# Patient Record
Sex: FEMALE | Race: WHITE | NOT HISPANIC OR LATINO
[De-identification: names, ages, dates, MRNs, and addresses within clinical notes are randomized per-mention and may not be internally consistent; named-entity substitution may affect disease eponyms.]

---

## 2020-06-29 PROBLEM — Z00.00 ENCOUNTER FOR PREVENTIVE HEALTH EXAMINATION: Status: ACTIVE | Noted: 2020-06-29

## 2020-07-02 ENCOUNTER — APPOINTMENT (OUTPATIENT)
Dept: GASTROENTEROLOGY | Facility: CLINIC | Age: 85
End: 2020-07-02
Payer: MEDICARE

## 2020-07-02 VITALS
TEMPERATURE: 98.4 F | BODY MASS INDEX: 24.98 KG/M2 | SYSTOLIC BLOOD PRESSURE: 136 MMHG | WEIGHT: 119 LBS | HEIGHT: 58 IN | DIASTOLIC BLOOD PRESSURE: 64 MMHG | HEART RATE: 56 BPM

## 2020-07-02 DIAGNOSIS — R41.89 OTHER SYMPTOMS AND SIGNS INVOLVING COGNITIVE FUNCTIONS AND AWARENESS: ICD-10-CM

## 2020-07-02 DIAGNOSIS — Z78.9 OTHER SPECIFIED HEALTH STATUS: ICD-10-CM

## 2020-07-02 DIAGNOSIS — Z85.038 PERSONAL HISTORY OF OTHER MALIGNANT NEOPLASM OF LARGE INTESTINE: ICD-10-CM

## 2020-07-02 DIAGNOSIS — Z86.59 PERSONAL HISTORY OF OTHER MENTAL AND BEHAVIORAL DISORDERS: ICD-10-CM

## 2020-07-02 DIAGNOSIS — C80.1 MALIGNANT (PRIMARY) NEOPLASM, UNSPECIFIED: ICD-10-CM

## 2020-07-02 PROCEDURE — 99214 OFFICE O/P EST MOD 30 MIN: CPT

## 2020-07-02 RX ORDER — DONEPEZIL HYDROCHLORIDE 5 MG/1
5 TABLET, FILM COATED ORAL
Refills: 0 | Status: ACTIVE | COMMUNITY

## 2020-07-02 RX ORDER — LITHIUM CARBONATE 600 MG/1
CAPSULE ORAL
Refills: 0 | Status: ACTIVE | COMMUNITY

## 2020-07-02 RX ORDER — MIRTAZAPINE 30 MG/1
30 TABLET, ORALLY DISINTEGRATING ORAL
Refills: 0 | Status: ACTIVE | COMMUNITY

## 2020-07-02 RX ORDER — ATORVASTATIN CALCIUM 10 MG/1
10 TABLET, FILM COATED ORAL
Refills: 0 | Status: ACTIVE | COMMUNITY

## 2020-07-02 RX ORDER — ASPIRIN 325 MG
81 TABLET ORAL
Refills: 0 | Status: ACTIVE | COMMUNITY

## 2020-07-02 RX ORDER — METOPROLOL TARTRATE 100 MG/1
100 TABLET, FILM COATED ORAL
Refills: 0 | Status: ACTIVE | COMMUNITY

## 2020-07-02 RX ORDER — DIVALPROEX SODIUM 250 MG/1
250 TABLET, DELAYED RELEASE ORAL
Refills: 0 | Status: ACTIVE | COMMUNITY

## 2020-07-02 RX ORDER — QUETIAPINE 400 MG/1
TABLET, FILM COATED ORAL
Refills: 0 | Status: ACTIVE | COMMUNITY

## 2020-07-04 NOTE — HISTORY OF PRESENT ILLNESS
[FreeTextEntry1] : patient is here for follow up because of a history of a malignant colonic polyp, sigmoid colon, managed by colonoscopy alone\par she had ear last colonoscopy 4.5 years ago, and is due\par \par Patient has some cognitive difficulties, but [facility]\par \par no cardiovascular disease\par no diabetes\par no hx of smoking, no pulmonary disease\par \par  Depth Of Biopsy: dermis Bill For Surgical Tray: no Wound Care: No ointment Detail Level: Detailed Electrodesiccation Text: The wound bed was treated with electrodesiccation after the biopsy was performed. Type Of Destruction Used: Curettage Cryotherapy Text: The wound bed was treated with cryotherapy after the biopsy was performed. X Size Of Lesion In Cm: 0 Billing Type: Third-Party Bill Silver Nitrate Text: The wound bed was treated with silver nitrate after the biopsy was performed. Biopsy Type: H and E Consent: Written consent was obtained and risks were reviewed including but not limited to scarring, infection, bleeding, scabbing, incomplete removal, nerve damage and allergy to anesthesia. Was A Bandage Applied: Yes Anesthesia Type: 1% lidocaine with epinephrine Hemostasis: Drysol Anesthesia Volume In Cc: 0.5 Curettage Text: The wound bed was treated with curettage after the biopsy was performed. Electrodesiccation And Curettage Text: The wound bed was treated with electrodesiccation and curettage after the biopsy was performed. Post-Care Instructions: I reviewed with the patient in detail post-care instructions. Patient is to keep the biopsy site dry overnight, and then apply bacitracin twice daily until healed. Patient may apply hydrogen peroxide soaks to remove any crusting. Notification Instructions: Patient will be notified of biopsy results. However, patient instructed to call the office if not contacted within 2 weeks. Dressing: bandage Biopsy Method: Dermablade

## 2020-07-04 NOTE — ASSESSMENT
[FreeTextEntry1] : history of malignant colonic polyp, managed by colonoscopy\par 2.  last colonoscopy 4.5 years ago\par 3.  detailed discussion with patient's daughter Tejas, well know to me over the years, and we feel that it is worthwhile to proceed with one final colonoscopy.\par 4.  we discussed cologard...\par but for now we are planning to do colonoscopy unless the patients daughters decide otherwise\par \par plan\par 1.  colonoscopy\par 2.  bowel prep reviewed\par DR LOGAN RECOMMENDATIONS FOR OPTIMAL BOWEL PREP\par \par 1. split the Miralax drink: half the night before, and half the morning of\par 2.  dulcolax 10 mg [2 5 mg tablets]  before each half of the drink\par 3.  diet for breakfast and lunch the day before prep; light solid food, easily chewable, easily digested\par 4.  begin the prep the night before sometime after five pm, one hour after dulcolax is taken\par 5.  the night before; goal is to have loose bowel movements; take one to three extra doses of Miralax, either a 17 gram packet, or a scoopful of the powder, if needed to achieve loose stools\par 6.  the goal the morning of is to have clear liquid stools; otherwise, after the second half of the prep, you should take one to three extra doses of Miralax [see above]\par 7.  remember; the extra doses are only if you are not achieving good results; and the well prepped colon allows a more complete exam\par \par More than 50% of the face to face time was devoted to counseling and /or coordination of care.  THis coordination of care may have included reviewing other medical notes and reports, and communicating with other health professionals\par

## 2025-05-07 ENCOUNTER — RESULT REVIEW (OUTPATIENT)
Age: 89
End: 2025-05-07

## 2025-05-08 ENCOUNTER — TRANSCRIPTION ENCOUNTER (OUTPATIENT)
Age: 89
End: 2025-05-08

## 2025-05-14 ENCOUNTER — TRANSCRIPTION ENCOUNTER (OUTPATIENT)
Age: 89
End: 2025-05-14

## 2025-06-03 ENCOUNTER — TRANSCRIPTION ENCOUNTER (OUTPATIENT)
Age: 89
End: 2025-06-03

## 2025-06-18 ENCOUNTER — TRANSCRIPTION ENCOUNTER (OUTPATIENT)
Age: 89
End: 2025-06-18

## 2025-07-02 ENCOUNTER — APPOINTMENT (OUTPATIENT)
Facility: CLINIC | Age: 89
End: 2025-07-02
Payer: MEDICARE

## 2025-07-02 VITALS
WEIGHT: 120 LBS | HEART RATE: 71 BPM | TEMPERATURE: 97.5 F | HEIGHT: 58 IN | SYSTOLIC BLOOD PRESSURE: 118 MMHG | BODY MASS INDEX: 25.19 KG/M2 | OXYGEN SATURATION: 96 % | DIASTOLIC BLOOD PRESSURE: 64 MMHG

## 2025-07-02 PROCEDURE — 99215 OFFICE O/P EST HI 40 MIN: CPT

## 2025-07-02 PROCEDURE — 99205 OFFICE O/P NEW HI 60 MIN: CPT

## 2025-07-02 RX ORDER — METHENAMINE HIPPURATE 1 G/1
1 TABLET ORAL
Qty: 60 | Refills: 2 | Status: ACTIVE | COMMUNITY
Start: 2025-07-02 | End: 1900-01-01

## 2025-07-16 ENCOUNTER — APPOINTMENT (OUTPATIENT)
Facility: CLINIC | Age: 89
End: 2025-07-16

## 2025-07-16 VITALS
SYSTOLIC BLOOD PRESSURE: 128 MMHG | HEIGHT: 55 IN | OXYGEN SATURATION: 97 % | HEART RATE: 91 BPM | TEMPERATURE: 96.9 F | DIASTOLIC BLOOD PRESSURE: 63 MMHG | WEIGHT: 117 LBS | BODY MASS INDEX: 27.08 KG/M2

## 2025-07-16 PROCEDURE — 99213 OFFICE O/P EST LOW 20 MIN: CPT
